# Patient Record
Sex: FEMALE | Race: OTHER | HISPANIC OR LATINO | URBAN - METROPOLITAN AREA
[De-identification: names, ages, dates, MRNs, and addresses within clinical notes are randomized per-mention and may not be internally consistent; named-entity substitution may affect disease eponyms.]

---

## 2023-05-19 ENCOUNTER — INPATIENT (INPATIENT)
Facility: HOSPITAL | Age: 53
LOS: 2 days | Discharge: HOME | DRG: 872 | End: 2023-05-22
Attending: INTERNAL MEDICINE
Payer: COMMERCIAL

## 2023-05-19 VITALS
OXYGEN SATURATION: 93 % | SYSTOLIC BLOOD PRESSURE: 165 MMHG | RESPIRATION RATE: 18 BRPM | TEMPERATURE: 100 F | DIASTOLIC BLOOD PRESSURE: 87 MMHG | HEIGHT: 60 IN | HEART RATE: 103 BPM | WEIGHT: 123.02 LBS

## 2023-05-19 LAB
ALBUMIN SERPL ELPH-MCNC: 2.5 G/DL — LOW (ref 3.4–5)
ALP SERPL-CCNC: 425 U/L — HIGH (ref 40–120)
ALT FLD-CCNC: 162 U/L — HIGH (ref 12–42)
ANION GAP SERPL CALC-SCNC: 11 MMOL/L — SIGNIFICANT CHANGE UP (ref 9–16)
ANION GAP SERPL CALC-SCNC: 9 MMOL/L — SIGNIFICANT CHANGE UP (ref 9–16)
APPEARANCE UR: CLEAR — SIGNIFICANT CHANGE UP
APTT BLD: 30.5 SEC — SIGNIFICANT CHANGE UP (ref 27.5–35.5)
AST SERPL-CCNC: 114 U/L — HIGH (ref 15–37)
BACTERIA # UR AUTO: SIGNIFICANT CHANGE UP /HPF
BASOPHILS # BLD AUTO: 0 K/UL — SIGNIFICANT CHANGE UP (ref 0–0.2)
BASOPHILS NFR BLD AUTO: 0 % — SIGNIFICANT CHANGE UP (ref 0–2)
BILIRUB SERPL-MCNC: 3.4 MG/DL — HIGH (ref 0.2–1.2)
BILIRUB UR-MCNC: NEGATIVE — SIGNIFICANT CHANGE UP
BUN SERPL-MCNC: 5 MG/DL — LOW (ref 7–23)
BUN SERPL-MCNC: 5 MG/DL — LOW (ref 7–23)
CALCIUM SERPL-MCNC: 8.3 MG/DL — LOW (ref 8.5–10.5)
CALCIUM SERPL-MCNC: 8.7 MG/DL — SIGNIFICANT CHANGE UP (ref 8.5–10.5)
CHLORIDE SERPL-SCNC: 102 MMOL/L — SIGNIFICANT CHANGE UP (ref 96–108)
CHLORIDE SERPL-SCNC: 96 MMOL/L — SIGNIFICANT CHANGE UP (ref 96–108)
CO2 SERPL-SCNC: 28 MMOL/L — SIGNIFICANT CHANGE UP (ref 22–31)
CO2 SERPL-SCNC: 30 MMOL/L — SIGNIFICANT CHANGE UP (ref 22–31)
COLOR SPEC: YELLOW — SIGNIFICANT CHANGE UP
COMMENT - URINE: SIGNIFICANT CHANGE UP
CREAT SERPL-MCNC: 0.62 MG/DL — SIGNIFICANT CHANGE UP (ref 0.5–1.3)
CREAT SERPL-MCNC: 0.62 MG/DL — SIGNIFICANT CHANGE UP (ref 0.5–1.3)
CRP SERPL-MCNC: >120 MG/L — HIGH (ref 0–9)
DIFF PNL FLD: ABNORMAL
EGFR: 107 ML/MIN/1.73M2 — SIGNIFICANT CHANGE UP
EGFR: 107 ML/MIN/1.73M2 — SIGNIFICANT CHANGE UP
EOSINOPHIL # BLD AUTO: 0 K/UL — SIGNIFICANT CHANGE UP (ref 0–0.5)
EOSINOPHIL NFR BLD AUTO: 0 % — SIGNIFICANT CHANGE UP (ref 0–6)
EPI CELLS # UR: ABNORMAL /HPF (ref 0–5)
GLUCOSE SERPL-MCNC: 137 MG/DL — HIGH (ref 70–99)
GLUCOSE SERPL-MCNC: 143 MG/DL — HIGH (ref 70–99)
GLUCOSE UR QL: NEGATIVE — SIGNIFICANT CHANGE UP
HCG SERPL-ACNC: 2 MIU/ML — SIGNIFICANT CHANGE UP
HCT VFR BLD CALC: 29.8 % — LOW (ref 34.5–45)
HGB BLD-MCNC: 10.2 G/DL — LOW (ref 11.5–15.5)
HIV 1 & 2 AB SERPL IA.RAPID: SIGNIFICANT CHANGE UP
INR BLD: 1.29 — HIGH (ref 0.88–1.16)
KETONES UR-MCNC: NEGATIVE — SIGNIFICANT CHANGE UP
LACTATE SERPL-SCNC: 0.8 MMOL/L — SIGNIFICANT CHANGE UP (ref 0.4–2)
LEUKOCYTE ESTERASE UR-ACNC: NEGATIVE — SIGNIFICANT CHANGE UP
LYMPHOCYTES # BLD AUTO: 19 % — SIGNIFICANT CHANGE UP (ref 13–44)
LYMPHOCYTES # BLD AUTO: 4.84 K/UL — HIGH (ref 1–3.3)
MAGNESIUM SERPL-MCNC: 1.6 MG/DL — SIGNIFICANT CHANGE UP (ref 1.6–2.6)
MANUAL SMEAR VERIFICATION: SIGNIFICANT CHANGE UP
MCHC RBC-ENTMCNC: 29 PG — SIGNIFICANT CHANGE UP (ref 27–34)
MCHC RBC-ENTMCNC: 34.2 GM/DL — SIGNIFICANT CHANGE UP (ref 32–36)
MCV RBC AUTO: 84.7 FL — SIGNIFICANT CHANGE UP (ref 80–100)
METAMYELOCYTES # FLD: 1 % — HIGH (ref 0–0)
MONOCYTES # BLD AUTO: 1.27 K/UL — HIGH (ref 0–0.9)
MONOCYTES NFR BLD AUTO: 5 % — SIGNIFICANT CHANGE UP (ref 2–14)
NEUTROPHILS # BLD AUTO: 19.1 K/UL — HIGH (ref 1.8–7.4)
NEUTROPHILS NFR BLD AUTO: 74 % — SIGNIFICANT CHANGE UP (ref 43–77)
NEUTS BAND # BLD: 1 % — SIGNIFICANT CHANGE UP (ref 0–8)
NEUTS VAC BLD QL SMEAR: SLIGHT — SIGNIFICANT CHANGE UP
NITRITE UR-MCNC: NEGATIVE — SIGNIFICANT CHANGE UP
NRBC # BLD: 0 /100 — SIGNIFICANT CHANGE UP (ref 0–0)
PH UR: 7 — SIGNIFICANT CHANGE UP (ref 5–8)
PLAT MORPH BLD: NORMAL — SIGNIFICANT CHANGE UP
PLATELET # BLD AUTO: 375 K/UL — SIGNIFICANT CHANGE UP (ref 150–400)
POLYCHROMASIA BLD QL SMEAR: SLIGHT — SIGNIFICANT CHANGE UP
POTASSIUM SERPL-MCNC: 2.6 MMOL/L — CRITICAL LOW (ref 3.5–5.3)
POTASSIUM SERPL-MCNC: 3.4 MMOL/L — LOW (ref 3.5–5.3)
POTASSIUM SERPL-SCNC: 2.6 MMOL/L — CRITICAL LOW (ref 3.5–5.3)
POTASSIUM SERPL-SCNC: 3.4 MMOL/L — LOW (ref 3.5–5.3)
PROT SERPL-MCNC: 8 G/DL — SIGNIFICANT CHANGE UP (ref 6.4–8.2)
PROT UR-MCNC: NEGATIVE MG/DL — SIGNIFICANT CHANGE UP
PROTHROM AB SERPL-ACNC: 15 SEC — HIGH (ref 10.5–13.4)
RBC # BLD: 3.52 M/UL — LOW (ref 3.8–5.2)
RBC # FLD: 13.3 % — SIGNIFICANT CHANGE UP (ref 10.3–14.5)
RBC BLD AUTO: NORMAL — SIGNIFICANT CHANGE UP
RBC CASTS # UR COMP ASSIST: < 5 /HPF — SIGNIFICANT CHANGE UP
SARS-COV-2 RNA SPEC QL NAA+PROBE: SIGNIFICANT CHANGE UP
SODIUM SERPL-SCNC: 137 MMOL/L — SIGNIFICANT CHANGE UP (ref 132–145)
SODIUM SERPL-SCNC: 139 MMOL/L — SIGNIFICANT CHANGE UP (ref 132–145)
SP GR SPEC: <=1.005 — SIGNIFICANT CHANGE UP (ref 1–1.03)
UROBILINOGEN FLD QL: 0.2 E.U./DL — SIGNIFICANT CHANGE UP
WBC # BLD: 25.46 K/UL — HIGH (ref 3.8–10.5)
WBC # FLD AUTO: 25.46 K/UL — HIGH (ref 3.8–10.5)
WBC UR QL: < 5 /HPF — SIGNIFICANT CHANGE UP

## 2023-05-19 PROCEDURE — 76705 ECHO EXAM OF ABDOMEN: CPT | Mod: 26

## 2023-05-19 PROCEDURE — 99285 EMERGENCY DEPT VISIT HI MDM: CPT

## 2023-05-19 PROCEDURE — 73201 CT UPPER EXTREMITY W/DYE: CPT | Mod: 26,LT

## 2023-05-19 PROCEDURE — 73130 X-RAY EXAM OF HAND: CPT | Mod: 26,LT

## 2023-05-19 RX ORDER — ACETAMINOPHEN 500 MG
650 TABLET ORAL ONCE
Refills: 0 | Status: COMPLETED | OUTPATIENT
Start: 2023-05-19 | End: 2023-05-19

## 2023-05-19 RX ORDER — CEFTRIAXONE 500 MG/1
1000 INJECTION, POWDER, FOR SOLUTION INTRAMUSCULAR; INTRAVENOUS ONCE
Refills: 0 | Status: COMPLETED | OUTPATIENT
Start: 2023-05-19 | End: 2023-05-19

## 2023-05-19 RX ORDER — SODIUM CHLORIDE 9 MG/ML
1400 INJECTION, SOLUTION INTRAVENOUS ONCE
Refills: 0 | Status: COMPLETED | OUTPATIENT
Start: 2023-05-19 | End: 2023-05-19

## 2023-05-19 RX ORDER — POTASSIUM CHLORIDE 20 MEQ
40 PACKET (EA) ORAL ONCE
Refills: 0 | Status: COMPLETED | OUTPATIENT
Start: 2023-05-19 | End: 2023-05-19

## 2023-05-19 RX ORDER — KETOROLAC TROMETHAMINE 30 MG/ML
15 SYRINGE (ML) INJECTION ONCE
Refills: 0 | Status: DISCONTINUED | OUTPATIENT
Start: 2023-05-19 | End: 2023-05-19

## 2023-05-19 RX ORDER — VANCOMYCIN HCL 1 G
1000 VIAL (EA) INTRAVENOUS ONCE
Refills: 0 | Status: COMPLETED | OUTPATIENT
Start: 2023-05-19 | End: 2023-05-19

## 2023-05-19 RX ORDER — POTASSIUM CHLORIDE 20 MEQ
10 PACKET (EA) ORAL ONCE
Refills: 0 | Status: COMPLETED | OUTPATIENT
Start: 2023-05-19 | End: 2023-05-19

## 2023-05-19 RX ADMIN — Medication 100 MILLIEQUIVALENT(S): at 15:28

## 2023-05-19 RX ADMIN — SODIUM CHLORIDE 1400 MILLILITER(S): 9 INJECTION, SOLUTION INTRAVENOUS at 13:48

## 2023-05-19 RX ADMIN — CEFTRIAXONE 100 MILLIGRAM(S): 500 INJECTION, POWDER, FOR SOLUTION INTRAMUSCULAR; INTRAVENOUS at 13:47

## 2023-05-19 RX ADMIN — Medication 15 MILLIGRAM(S): at 14:34

## 2023-05-19 RX ADMIN — Medication 40 MILLIEQUIVALENT(S): at 15:01

## 2023-05-19 RX ADMIN — Medication 250 MILLIGRAM(S): at 14:34

## 2023-05-19 RX ADMIN — Medication 650 MILLIGRAM(S): at 14:34

## 2023-05-19 NOTE — ED PROVIDER NOTE - CLINICAL SUMMARY MEDICAL DECISION MAKING FREE TEXT BOX
Chest patient arrives as a sepsis code activation due to fever/tachycardia.  On examination patient with left hand cellulitis with erythema in the dorsum and seems to be stemming from the base of the fourth digit.  No planes of entry, no tissue defects, no fluctuation clinically does not seem like an abscess.  There seems to be tracking proximally so concern for rapidly progressing infection.  Will get sepsis labs, cultures, broad-spectrum antibiotics we will get an x-ray to rule out fracture but also a CT to assess for any signs of soft tissue abscess/fluid collection.

## 2023-05-19 NOTE — ED ADULT NURSE NOTE - ISOLATION TYPE:
None Detail Level: Detailed Depth Of Biopsy: dermis Was A Bandage Applied: Yes Size Of Lesion In Cm: 0 Biopsy Type: H and E Biopsy Method: Dermablade Anesthesia Type: 1% lidocaine with epinephrine and a 1:10 solution of 8.4% sodium bicarbonate Anesthesia Volume In Cc (Will Not Render If 0): 0.5 Hemostasis: Drysol Wound Care: Vaseline Dressing: pressure dressing with telfa Destruction After The Procedure: No Type Of Destruction Used: Curettage Lab: [id number] Lab Facility: [id number] Consent: Verbal consent was obtained and risks were reviewed including but not limited to scarring, infection, bleeding, scabbing, incomplete removal, nerve damage and allergy to anesthesia. Post-Care Instructions: I reviewed with the patient in detail post-care instructions. Patient is to keep the biopsy site dry overnight, and then apply bacitracin twice daily until healed. Patient may apply hydrogen peroxide soaks to remove any crusting. Notification Instructions: Patient will be notified of biopsy results. However, patient instructed to call the office if not contacted within 2 weeks. Billing Type: Third-Party Bill Information: Selecting Yes will display possible errors in your note based on the variables you have selected. This validation is only offered as a suggestion for you. PLEASE NOTE THAT THE VALIDATION TEXT WILL BE REMOVED WHEN YOU FINALIZE YOUR NOTE. IF YOU WANT TO FAX A PRELIMINARY NOTE YOU WILL NEED TO TOGGLE THIS TO 'NO' IF YOU DO NOT WANT IT IN YOUR FAXED NOTE. No

## 2023-05-19 NOTE — ED ADULT NURSE NOTE - NSFALLUNIVINTERV_ED_ALL_ED
Bed/Stretcher in lowest position, wheels locked, appropriate side rails in place/Call bell, personal items and telephone in reach/Instruct patient to call for assistance before getting out of bed/chair/stretcher/Non-slip footwear applied when patient is off stretcher/[place] to call system/Physically safe environment - no spills, clutter or unnecessary equipment/Purposeful proactive rounding/Room/bathroom lighting operational, light cord in reach

## 2023-05-19 NOTE — ED PROVIDER NOTE - OBJECTIVE STATEMENT
52-year-old female, no history of medical problems.  Patient currently traveling from [place] but originally from the [facility].  Patient presents for 3 days progression of left hand pain, swelling, redness.  Patient then developed fever chills while traveling from [place] to [place] where she is currently visiting.  Patient was seen at urgent care 2 days ago and prescribed Keflex every 6 hours which she has been taking as indicated.  Today is day 3 of antibiotics.  Since left hand redness and swelling has worsened and it seems to be moving proximally to the forearm.  Patient does not remember any trauma to the area, any leg tissue defects and the insect bites etc.  Denies any history of leg abscesses or cellulitis in the past.

## 2023-05-19 NOTE — ED ADULT NURSE NOTE - OBJECTIVE STATEMENT
pt c/o cellulitis of L hand cellulitis/redness/swelling since tuesday. pt states she was seen at  and sent abx. denies pain on palpation, +ROM of fingers. pt warm to touch, septic workup initiated.

## 2023-05-19 NOTE — ED ADULT TRIAGE NOTE - CHIEF COMPLAINT QUOTE
c/o L hand pain/redness swelling starting with one finger and R FA redness/swelling on 5/16, went to UC yesterday, rx'd keflex, R arm has gotten better but L hand is worse. SIRS criteria met, protocol initiated and followed

## 2023-05-19 NOTE — ED PROVIDER NOTE - MUSCULOSKELETAL, MLM
L 4th digit swelling, redness, also extending proximally into dorsum of hand and streaking proximally to forearm

## 2023-05-19 NOTE — ED PROVIDER NOTE - PROGRESS NOTE DETAILS
Got a call from the lab for significant hypokalemia with a potassium of 2.6.  Called lab added magnesium level to the work-up.  Will replace potassium orally and IV apple placing cardiac monitoring during replacement. Hypokalemia resolved after IV and p.o. replacement.  Scans consistent with tenosynovitis but no abscess.  Concern for cellulitis given fever, chills, leukocytosis.  Initially called telemetry attending Dr. Clark to consider telemetry admission given hypokalemia on the 3 but she states these cases are usually surgical admissions.  Repeated labs and K improved.  Presented case them to hand surgery on-call Dr. Barton and he recommends internal medicine admission for IV antibiotics and he can come can be consulted in the hospital as needed.  Then attempted admitting to hospitalist service and now awaiting callback for admission. nerissa: patient endorsed to me, pending admission. called ctc, dr jin Hospitalist on-call excepted patient to [facility].  Patient's labs were reviewed and are stable.  Patient consented for transfer to [facility] she is aware that she will be transferred.

## 2023-05-20 DIAGNOSIS — R74.01 ELEVATION OF LEVELS OF LIVER TRANSAMINASE LEVELS: ICD-10-CM

## 2023-05-20 DIAGNOSIS — Z29.9 ENCOUNTER FOR PROPHYLACTIC MEASURES, UNSPECIFIED: ICD-10-CM

## 2023-05-20 DIAGNOSIS — A41.9 SEPSIS, UNSPECIFIED ORGANISM: ICD-10-CM

## 2023-05-20 LAB
ALBUMIN SERPL ELPH-MCNC: 3 G/DL — LOW (ref 3.3–5)
ALP SERPL-CCNC: 396 U/L — HIGH (ref 40–120)
ALT FLD-CCNC: 151 U/L — HIGH (ref 10–45)
ANION GAP SERPL CALC-SCNC: 12 MMOL/L — SIGNIFICANT CHANGE UP (ref 5–17)
AST SERPL-CCNC: 119 U/L — HIGH (ref 10–40)
BASOPHILS # BLD AUTO: 0.07 K/UL — SIGNIFICANT CHANGE UP (ref 0–0.2)
BASOPHILS NFR BLD AUTO: 0.4 % — SIGNIFICANT CHANGE UP (ref 0–2)
BILIRUB SERPL-MCNC: 2 MG/DL — HIGH (ref 0.2–1.2)
BUN SERPL-MCNC: 4 MG/DL — LOW (ref 7–23)
CALCIUM SERPL-MCNC: 8.9 MG/DL — SIGNIFICANT CHANGE UP (ref 8.4–10.5)
CHLORIDE SERPL-SCNC: 100 MMOL/L — SIGNIFICANT CHANGE UP (ref 96–108)
CO2 SERPL-SCNC: 26 MMOL/L — SIGNIFICANT CHANGE UP (ref 22–31)
CREAT SERPL-MCNC: 0.45 MG/DL — LOW (ref 0.5–1.3)
CRP SERPL-MCNC: 95.1 MG/L — HIGH (ref 0–4)
CULTURE RESULTS: NO GROWTH — SIGNIFICANT CHANGE UP
EGFR: 116 ML/MIN/1.73M2 — SIGNIFICANT CHANGE UP
EOSINOPHIL # BLD AUTO: 0.08 K/UL — SIGNIFICANT CHANGE UP (ref 0–0.5)
EOSINOPHIL NFR BLD AUTO: 0.4 % — SIGNIFICANT CHANGE UP (ref 0–6)
ERYTHROCYTE [SEDIMENTATION RATE] IN BLOOD: 107 MM/HR — HIGH
GGT SERPL-CCNC: 613 U/L — HIGH (ref 8–40)
GLUCOSE SERPL-MCNC: 133 MG/DL — HIGH (ref 70–99)
HAV IGM SER-ACNC: SIGNIFICANT CHANGE UP
HBV CORE AB SER-ACNC: SIGNIFICANT CHANGE UP
HBV CORE IGM SER-ACNC: SIGNIFICANT CHANGE UP
HBV SURFACE AB SER-ACNC: SIGNIFICANT CHANGE UP
HBV SURFACE AG SER-ACNC: SIGNIFICANT CHANGE UP
HCT VFR BLD CALC: 30.7 % — LOW (ref 34.5–45)
HCV AB S/CO SERPL IA: 0.1 S/CO — SIGNIFICANT CHANGE UP (ref 0–0.99)
HCV AB SERPL-IMP: SIGNIFICANT CHANGE UP
HGB BLD-MCNC: 10.3 G/DL — LOW (ref 11.5–15.5)
IMM GRANULOCYTES NFR BLD AUTO: 4 % — HIGH (ref 0–0.9)
LYMPHOCYTES # BLD AUTO: 16.6 % — SIGNIFICANT CHANGE UP (ref 13–44)
LYMPHOCYTES # BLD AUTO: 3.12 K/UL — SIGNIFICANT CHANGE UP (ref 1–3.3)
MAGNESIUM SERPL-MCNC: 1.8 MG/DL — SIGNIFICANT CHANGE UP (ref 1.6–2.6)
MCHC RBC-ENTMCNC: 28.9 PG — SIGNIFICANT CHANGE UP (ref 27–34)
MCHC RBC-ENTMCNC: 33.6 GM/DL — SIGNIFICANT CHANGE UP (ref 32–36)
MCV RBC AUTO: 86.2 FL — SIGNIFICANT CHANGE UP (ref 80–100)
MONOCYTES # BLD AUTO: 0.98 K/UL — HIGH (ref 0–0.9)
MONOCYTES NFR BLD AUTO: 5.2 % — SIGNIFICANT CHANGE UP (ref 2–14)
NEUTROPHILS # BLD AUTO: 13.85 K/UL — HIGH (ref 1.8–7.4)
NEUTROPHILS NFR BLD AUTO: 73.4 % — SIGNIFICANT CHANGE UP (ref 43–77)
NRBC # BLD: 0 /100 WBCS — SIGNIFICANT CHANGE UP (ref 0–0)
PHOSPHATE SERPL-MCNC: 3.1 MG/DL — SIGNIFICANT CHANGE UP (ref 2.5–4.5)
PLATELET # BLD AUTO: 386 K/UL — SIGNIFICANT CHANGE UP (ref 150–400)
POTASSIUM SERPL-MCNC: 3.4 MMOL/L — LOW (ref 3.5–5.3)
POTASSIUM SERPL-SCNC: 3.4 MMOL/L — LOW (ref 3.5–5.3)
PROT SERPL-MCNC: 7.5 G/DL — SIGNIFICANT CHANGE UP (ref 6–8.3)
RBC # BLD: 3.56 M/UL — LOW (ref 3.8–5.2)
RBC # FLD: 13.6 % — SIGNIFICANT CHANGE UP (ref 10.3–14.5)
SODIUM SERPL-SCNC: 138 MMOL/L — SIGNIFICANT CHANGE UP (ref 135–145)
SPECIMEN SOURCE: SIGNIFICANT CHANGE UP
VANCOMYCIN TROUGH SERPL-MCNC: 8.1 UG/ML — LOW (ref 10–20)
WBC # BLD: 18.85 K/UL — HIGH (ref 3.8–10.5)
WBC # FLD AUTO: 18.85 K/UL — HIGH (ref 3.8–10.5)

## 2023-05-20 PROCEDURE — 73219 MRI UPPER EXTREMITY W/DYE: CPT | Mod: 26,LT

## 2023-05-20 PROCEDURE — 99223 1ST HOSP IP/OBS HIGH 75: CPT | Mod: GC

## 2023-05-20 RX ORDER — LANOLIN ALCOHOL/MO/W.PET/CERES
3 CREAM (GRAM) TOPICAL AT BEDTIME
Refills: 0 | Status: DISCONTINUED | OUTPATIENT
Start: 2023-05-20 | End: 2023-05-22

## 2023-05-20 RX ORDER — ENOXAPARIN SODIUM 100 MG/ML
40 INJECTION SUBCUTANEOUS EVERY 24 HOURS
Refills: 0 | Status: DISCONTINUED | OUTPATIENT
Start: 2023-05-20 | End: 2023-05-22

## 2023-05-20 RX ORDER — POTASSIUM CHLORIDE 20 MEQ
20 PACKET (EA) ORAL ONCE
Refills: 0 | Status: COMPLETED | OUTPATIENT
Start: 2023-05-20 | End: 2023-05-20

## 2023-05-20 RX ORDER — ONDANSETRON 8 MG/1
4 TABLET, FILM COATED ORAL EVERY 8 HOURS
Refills: 0 | Status: DISCONTINUED | OUTPATIENT
Start: 2023-05-20 | End: 2023-05-22

## 2023-05-20 RX ORDER — ACETAMINOPHEN 500 MG
650 TABLET ORAL EVERY 6 HOURS
Refills: 0 | Status: DISCONTINUED | OUTPATIENT
Start: 2023-05-20 | End: 2023-05-22

## 2023-05-20 RX ORDER — MORPHINE SULFATE 50 MG/1
2 CAPSULE, EXTENDED RELEASE ORAL ONCE
Refills: 0 | Status: DISCONTINUED | OUTPATIENT
Start: 2023-05-20 | End: 2023-05-20

## 2023-05-20 RX ORDER — VANCOMYCIN HCL 1 G
1000 VIAL (EA) INTRAVENOUS ONCE
Refills: 0 | Status: COMPLETED | OUTPATIENT
Start: 2023-05-20 | End: 2023-05-20

## 2023-05-20 RX ORDER — POTASSIUM CHLORIDE 20 MEQ
40 PACKET (EA) ORAL ONCE
Refills: 0 | Status: COMPLETED | OUTPATIENT
Start: 2023-05-20 | End: 2023-05-20

## 2023-05-20 RX ORDER — KETOROLAC TROMETHAMINE 30 MG/ML
15 SYRINGE (ML) INJECTION ONCE
Refills: 0 | Status: DISCONTINUED | OUTPATIENT
Start: 2023-05-20 | End: 2023-05-20

## 2023-05-20 RX ORDER — MAGNESIUM SULFATE 500 MG/ML
1 VIAL (ML) INJECTION ONCE
Refills: 0 | Status: COMPLETED | OUTPATIENT
Start: 2023-05-20 | End: 2023-05-20

## 2023-05-20 RX ORDER — CEFTRIAXONE 500 MG/1
1000 INJECTION, POWDER, FOR SOLUTION INTRAMUSCULAR; INTRAVENOUS EVERY 24 HOURS
Refills: 0 | Status: DISCONTINUED | OUTPATIENT
Start: 2023-05-20 | End: 2023-05-22

## 2023-05-20 RX ORDER — VANCOMYCIN HCL 1 G
1000 VIAL (EA) INTRAVENOUS EVERY 12 HOURS
Refills: 0 | Status: COMPLETED | OUTPATIENT
Start: 2023-05-20 | End: 2023-05-20

## 2023-05-20 RX ORDER — VANCOMYCIN HCL 1 G
1500 VIAL (EA) INTRAVENOUS EVERY 12 HOURS
Refills: 0 | Status: DISCONTINUED | OUTPATIENT
Start: 2023-05-21 | End: 2023-05-21

## 2023-05-20 RX ADMIN — Medication 15 MILLIGRAM(S): at 02:35

## 2023-05-20 RX ADMIN — Medication 20 MILLIEQUIVALENT(S): at 12:20

## 2023-05-20 RX ADMIN — ENOXAPARIN SODIUM 40 MILLIGRAM(S): 100 INJECTION SUBCUTANEOUS at 07:35

## 2023-05-20 RX ADMIN — Medication 100 GRAM(S): at 09:44

## 2023-05-20 RX ADMIN — CEFTRIAXONE 100 MILLIGRAM(S): 500 INJECTION, POWDER, FOR SOLUTION INTRAMUSCULAR; INTRAVENOUS at 07:34

## 2023-05-20 RX ADMIN — Medication 250 MILLIGRAM(S): at 12:21

## 2023-05-20 RX ADMIN — MORPHINE SULFATE 2 MILLIGRAM(S): 50 CAPSULE, EXTENDED RELEASE ORAL at 02:35

## 2023-05-20 RX ADMIN — Medication 250 MILLIGRAM(S): at 01:52

## 2023-05-20 RX ADMIN — Medication 40 MILLIEQUIVALENT(S): at 09:42

## 2023-05-20 NOTE — PROGRESS NOTE ADULT - ASSESSMENT
Problem: Patient Care Overview  Goal: Plan of Care Review  Outcome: Ongoing (interventions implemented as appropriate)  Pt is resting in bed, continues to complain of sharp pain to lower abdomen. Pt's right chest port is currently saline locked. VS stable, pt is afebrile. Pt's personal items and call bell placed within easy reach. Bed is locked, in lowest position, with side rails up x 2. Non-skid socks in place. Pt encouraged to call for assistance as needed. Will continue to monitor.       Patient is a 51 yo F with no known past medical Hx, who presents with L hand swelling, erythema, and pain, admitted for L hand cellulitis.

## 2023-05-20 NOTE — H&P ADULT - ASSESSMENT
Patient is a 51 yo F with no known past medical Hx, who presents with L hand swelling, erythema, and pain, admitted for L hand cellulitis.

## 2023-05-20 NOTE — CONSULT NOTE ADULT - SUBJECTIVE AND OBJECTIVE BOX
Orthopaedic Hand Surgery Consult Note    For Surgeon: Dr Thompson     HPI:  Patient is a 51 yo F with no known medical Hx. Patient is currently traveling from [place], but is originally from the [facility]. Patient presents with three days of worsening L hand pain, swelling, and erythema. Patient first developed subjective fever and chills while traveling from [place] to [place]. Patient sought care at an urgent care facility that where she was prescribed Keflex 500 mg PO Q6 which she has been compliant with. Today is day three of ABX. Patient has noticed a marked improvment since her admission to the ED.    ED Course  Vitals (triage): 165/87 103 99.7 93% on RA  Labs: WBC 25.46 Hb 10.2 K 2.6 > 3.4 bilirubin 3.4 Alk Phos 425   CRP > 120  EKG: NSR  Imagin.  Acute left extensor carpi ulnaris tenosynovitis at the level of the wrist. Acute left ring finger common flexor tenosynovitis centered at the level of the MCP. Differential includes both inflammatory and infectious etiologies. Consider follow-up left hand and wrist MRIs. 2.  No drainable mature abscess or tracking soft tissue emphysema  Medication: morphine 2 mg IV ketorolac 15 mg IV x 2 K 50 mEq Vancomycin 1 g x 2 Ceftriaxone 1 g   (20 May 2023 03:46)    Ortho Addendum  Ortho consulted for atrumatic L hand and 4th finger swelling and erythema x 3d. Endorses an ep of chill assoc w/ sxs. States that swelling, erythema, and pain has significantly improved since she started receiving IV abx in ED and at [facility]     Vital Signs Last 24 Hrs  T(C): 36.9 (20 May 2023 13:11), Max: 36.9 (19 May 2023 21:28)  T(F): 98.5 (20 May 2023 13:11), Max: 98.5 (20 May 2023 13:11)  HR: 99 (20 May 2023 13:11) (86 - 99)  BP: 170/92 (20 May 2023 13:11) (162/81 - 172/99)  BP(mean): --  RR: 19 (20 May 2023 13:11) (16 - 19)  SpO2: 99% (20 May 2023 13:11) (96% - 99%)    Parameters below as of 20 May 2023 13:11  Patient On (Oxygen Delivery Method): room air        Physical Exam:  General: AAOx3, NAD  L Hand: + swelling and erythema @ dorsum of L hand along w/ dorsum of L MCP and L 4th prox phalanx   ROM of L 4th finger MCP/PIP limited 2/2 swelling, reduced ROM of L 4th DIP as well, FROM all other joints of hand and wrist of LUE   SILT grossly M/R/U/LABC/MABC  Radial Pulse 2+  No pain with passive stretch of L 4th finger  Flexor sheath of L 4th finger non ttp     Imaging:   CT personal read demonstrates enhancement @ ECU tendon near ulnar head and fat stranding dorsally @ level of MCPJ     A/P: 52yFemale w cellulitis of L hand and L 4th finger. No concern for pyogenic flexor tenosynovitis of 4th finger at this time     Plan   - c/w IV abx as per med  - elevate LUE at all times in liz block   - rest of care per primary     - Discussed with Dr. Jay Esparza, PGY-2  Orthopedic Surgery

## 2023-05-20 NOTE — H&P ADULT - PROBLEM SELECTOR PLAN 2
Likely 2/2 sepsis. RUQ US WNL.   - complete hepatitis panel  - added on GGT  - continue to trend LFT

## 2023-05-20 NOTE — PROGRESS NOTE ADULT - PROBLEM SELECTOR PLAN 1
# Cellulitis of Left Hand  Pt meeting 2/4 SIRS on admission:  and WBC 25.46. Likely 2/2 L hand cellulitis. L hand swelling extending from all fingers to just past wrist. No numbness/tingling. Pulses intact. Notable warmth and swelling on exam.   - c/w Ceftriaxone 1g QD  - c/w Vancomycin 1g Q12   - f/u Vancomycin trough ordered for 10:00 pm 5/20  - hand surgery consulted, appreciate recs  - f/u MRI hand

## 2023-05-20 NOTE — PROGRESS NOTE ADULT - SUBJECTIVE AND OBJECTIVE BOX
SUBJECTIVE:  Patient seen and examined at bedside.    Vital Signs Last 12 Hrs  T(F): 98.5 (05-20-23 @ 13:11), Max: 98.5 (05-20-23 @ 13:11)  HR: 99 (05-20-23 @ 13:11) (87 - 99)  BP: 170/92 (05-20-23 @ 13:11) (164/84 - 170/92)  BP(mean): --  RR: 19 (05-20-23 @ 13:11) (18 - 19)  SpO2: 99% (05-20-23 @ 13:11) (98% - 99%)  I&O's Summary      PHYSICAL EXAM:  Constitutional: NAD, comfortable in bed.  HEENT: NC/AT, PERRLA, EOMI, no conjunctival pallor or scleral icterus, MMM  Neck: Supple, no JVD  Respiratory: CTA B/L. No w/r/r.   Cardiovascular: RRR, normal S1 and S2, no m/r/g.   Gastrointestinal: +BS, soft NTND, no guarding or rebound tenderness, no palpable masses   Extremities: wwp; no cyanosis, clubbing or edema.   Vascular: Pulses equal and strong throughout.   Neurological: AAOx3, no CN deficits, strength and sensation intact throughout.   Skin: No gross skin abnormalities or rashes        LABS:                        10.3   18.85 )-----------( 386      ( 20 May 2023 05:30 )             30.7     05-20    138  |  100  |  4<L>  ----------------------------<  133<H>  3.4<L>   |  26  |  0.45<L>    Ca    8.9      20 May 2023 05:30  Phos  3.1     05-20  Mg     1.8     05-20    TPro  7.5  /  Alb  3.0<L>  /  TBili  2.0<H>  /  DBili  x   /  AST  119<H>  /  ALT  151<H>  /  AlkPhos  396<H>  05-20    PT/INR - ( 19 May 2023 13:23 )   PT: 15.0 sec;   INR: 1.29          PTT - ( 19 May 2023 13:23 )  PTT:30.5 sec  Urinalysis Basic - ( 19 May 2023 14:30 )    Color: Yellow / Appearance: Clear / SG: <=1.005 / pH: x  Gluc: x / Ketone: NEGATIVE  / Bili: NEGATIVE / Urobili: 0.2 E.U./dL   Blood: x / Protein: NEGATIVE mg/dL / Nitrite: NEGATIVE   Leuk Esterase: NEGATIVE / RBC: < 5 /HPF / WBC < 5 /HPF   Sq Epi: x / Non Sq Epi: x / Bacteria: None /HPF          RADIOLOGY & ADDITIONAL TESTS:    MEDICATIONS  (STANDING):  cefTRIAXone   IVPB 1000 milliGRAM(s) IV Intermittent every 24 hours  enoxaparin Injectable 40 milliGRAM(s) SubCutaneous every 24 hours    MEDICATIONS  (PRN):  acetaminophen     Tablet .. 650 milliGRAM(s) Oral every 6 hours PRN Temp greater or equal to 38C (100.4F), Mild Pain (1 - 3)  aluminum hydroxide/magnesium hydroxide/simethicone Suspension 30 milliLiter(s) Oral every 4 hours PRN Dyspepsia  melatonin 3 milliGRAM(s) Oral at bedtime PRN Insomnia  ondansetron Injectable 4 milliGRAM(s) IV Push every 8 hours PRN Nausea and/or Vomiting   SUBJECTIVE:  Patient seen and examined at bedside. Denies pain, numbness, tingling of L hand. No other acute complaints.     Vital Signs Last 12 Hrs  T(F): 98.5 (05-20-23 @ 13:11), Max: 98.5 (05-20-23 @ 13:11)  HR: 99 (05-20-23 @ 13:11) (87 - 99)  BP: 170/92 (05-20-23 @ 13:11) (164/84 - 170/92)  BP(mean): --  RR: 19 (05-20-23 @ 13:11) (18 - 19)  SpO2: 99% (05-20-23 @ 13:11) (98% - 99%)  I&O's Summary      PHYSICAL EXAM:  Constitutional: NAD, comfortable in bed.  HEENT: NC/AT, EOMI, no conjunctival pallor or scleral icterus, MMM  Neck: Supple, no JVD  Respiratory: CTA B/L. No w/r/r.   Cardiovascular: RRR, normal S1 and S2, no m/r/g.   Gastrointestinal: +BS, soft NTND, no guarding or rebound tenderness, no palpable masses   Extremities: Left hand erythema and edema involving all fingers, no clear fluctuation,  no crepitus, able to move fingers, + pulses. LE warm, no edema, no calf tenderness, no focal deficit  Vascular: Pulses equal and strong throughout.   Neurological: AAOx3, no CN deficits, strength and sensation intact throughout.   Skin: No gross skin abnormalities or rashes        LABS:                        10.3   18.85 )-----------( 386      ( 20 May 2023 05:30 )             30.7     05-20    138  |  100  |  4<L>  ----------------------------<  133<H>  3.4<L>   |  26  |  0.45<L>    Ca    8.9      20 May 2023 05:30  Phos  3.1     05-20  Mg     1.8     05-20    TPro  7.5  /  Alb  3.0<L>  /  TBili  2.0<H>  /  DBili  x   /  AST  119<H>  /  ALT  151<H>  /  AlkPhos  396<H>  05-20    PT/INR - ( 19 May 2023 13:23 )   PT: 15.0 sec;   INR: 1.29          PTT - ( 19 May 2023 13:23 )  PTT:30.5 sec  Urinalysis Basic - ( 19 May 2023 14:30 )    Color: Yellow / Appearance: Clear / SG: <=1.005 / pH: x  Gluc: x / Ketone: NEGATIVE  / Bili: NEGATIVE / Urobili: 0.2 E.U./dL   Blood: x / Protein: NEGATIVE mg/dL / Nitrite: NEGATIVE   Leuk Esterase: NEGATIVE / RBC: < 5 /HPF / WBC < 5 /HPF   Sq Epi: x / Non Sq Epi: x / Bacteria: None /HPF          RADIOLOGY & ADDITIONAL TESTS:    MEDICATIONS  (STANDING):  cefTRIAXone   IVPB 1000 milliGRAM(s) IV Intermittent every 24 hours  enoxaparin Injectable 40 milliGRAM(s) SubCutaneous every 24 hours    MEDICATIONS  (PRN):  acetaminophen     Tablet .. 650 milliGRAM(s) Oral every 6 hours PRN Temp greater or equal to 38C (100.4F), Mild Pain (1 - 3)  aluminum hydroxide/magnesium hydroxide/simethicone Suspension 30 milliLiter(s) Oral every 4 hours PRN Dyspepsia  melatonin 3 milliGRAM(s) Oral at bedtime PRN Insomnia  ondansetron Injectable 4 milliGRAM(s) IV Push every 8 hours PRN Nausea and/or Vomiting

## 2023-05-20 NOTE — H&P ADULT - HISTORY OF PRESENT ILLNESS
Patient is a 53 yo F with no known medical Hx. Patient is currently traveling from [place], but is originally from the [facility]. Patient presents with three days of worsening L hand pain, swelling, and erythema. Patient first developed subjective fever and chills while traveling from [place] to [place]. Patient sought care at an urgent care facility that where she was prescribed Keflex 500 mg PO Q6 which she has been compliant with. Today is day three of ABX. Patient has noticed a marked improvment since her admission to the ED.    ED Course  Vitals (triage): 165/87 103 99.7 93% on RA  Labs: WBC 25.46 Hb 10.2 K 2.6 > 3.4 bilirubin 3.4 Alk Phos 425   CRP > 120  EKG: NSR  Imagin.  Acute left extensor carpi ulnaris tenosynovitis at the level of the wrist. Acute left ring finger common flexor tenosynovitis centered at the level of the MCP. Differential includes both inflammatory and infectious etiologies. Consider follow-up left hand and wrist MRIs. 2.  No drainable mature abscess or tracking soft tissue emphysema  Medication: morphine 2 mg IV ketorolac 15 mg IV x 2 K 50 mEq Vancomycin 1 g x 2 Ceftriaxone 1 g

## 2023-05-20 NOTE — H&P ADULT - ATTENDING COMMENTS
Patient with no PMH presents with left arm swelling. Patient denies previous trauma, no insect or animal bites. She received PO ATB as outpatient prior to presentation. This morning, patient reports left hand swelling improving. Able to better mobilize fingers, denies loss of sensation. No other complaints or events reported.    General: Initially sleeping, awakes to voice, NAD, lying in bed, speaking in full sentences, no labored breathing on RA  HEENT: AT/NC, no facial asymmetry  Lungs: no crackles, no wheezes  Heart: RRR  Abdomen: soft, non-tender  Extremities:  Left hand erythema and edema involving all fingers, no clear fluctuation,  no crepitus, able to move fingers, + pulses. LE warm, no edema, no calf tenderness, no focal deficit    Labs  WBC 25.4>18.8  H/H 10.3/30.7 MCV 86  K 3.4  Creat 0.45     Ariel T 2  CT reviewed  CXR reviewed    Sepsis  Hand cellulitis  LFTs abnormalities  Electrolytes abnormality     Patient presenting with left hand cellulitis, reports subjective improvement with Pip/Tazo/Vanc, leucocytosis improving, Get ESR/CRP, Hand surgery consult. MRI Hand. Get Vanc trough   Patient with cholestatic LFTs, denies abdominal pain, RUQ US WNL. Trend LFTs. follow-up GGT  Replete K  DVT ppx Patient with no PMH presents with left arm swelling. Patient denies previous trauma, no insect or animal bites. She received PO ATB as outpatient prior to presentation. This morning, patient reports left hand swelling improving. Able to better mobilize fingers, denies loss of sensation. No other complaints or events reported.    General: Initially sleeping, awakes to voice, NAD, lying in bed, speaking in full sentences, no labored breathing on RA  HEENT: AT/NC, no facial asymmetry  Lungs: no crackles, no wheezes  Heart: RRR  Abdomen: soft, non-tender, negative [place]   Extremities:  Left hand erythema and edema involving all fingers, no clear fluctuation,  no crepitus, able to move fingers, + pulses. LE warm, no edema, no calf tenderness, no focal deficit    Labs  WBC 25.4>18.8  H/H 10.3/30.7 MCV 86  K 3.4  Creat 0.45     Ariel T 2  CT reviewed  CXR reviewed    Sepsis  Hand cellulitis  LFTs abnormalities  Electrolytes abnormality     Patient presenting with left hand cellulitis, reports subjective improvement with Pip/Tazo/Vanc, leucocytosis improving, Get ESR/CRP, Hand surgery consult. MRI Hand. Get Vanc trough , follow-up Bcx   Patient with cholestatic LFTs, elevated ariel, denies abdominal pain, RUQ US WNL. Trend LFTs. follow-up GGT  Replete K  DVT ppx

## 2023-05-20 NOTE — H&P ADULT - PROBLEM SELECTOR PLAN 1
Likely 2/2 L hand cellulitis.  - c/w Ceftriaxone 1g QD  - c/w Vancomycin 1g Q12   - f/u Vancomycin trough ordered for 10:00 pm 5/20

## 2023-05-20 NOTE — H&P ADULT - NSHPPHYSICALEXAM_GEN_ALL_CORE
.  VITAL SIGNS:  T(C): 36.7 (05-20-23 @ 03:10), Max: 37.6 (05-19-23 @ 13:05)  T(F): 98.1 (05-20-23 @ 03:10), Max: 99.7 (05-19-23 @ 13:05)  HR: 87 (05-20-23 @ 03:10) (86 - 103)  BP: 165/81 (05-20-23 @ 03:10) (162/81 - 172/99)  BP(mean): --  RR: 18 (05-20-23 @ 03:10) (16 - 18)  SpO2: 98% (05-20-23 @ 03:10) (93% - 98%)  Wt(kg): --    PHYSICAL EXAM:    Constitutional: WDWN resting comfortably in bed; NAD  Head: NC/AT  Eyes: PERRL, EOMI, anicteric sclera  ENT: no nasal discharge; uvula midline, no oropharyngeal erythema or exudates; MMM  Neck: supple; no JVD or thyromegaly  Respiratory: CTA B/L; no W/R/R, no retractions  Cardiac: +S1/S2; RRR; no M/R/G; PMI non-displaced  Gastrointestinal: soft, NT/ND; no rebound or guarding; +BSx4  Genitourinary: normal external genitalia  Back: spine midline, no bony tenderness or step-offs; no CVAT B/L  Extremities: L hand swelling, erythema, and pain  Vascular: 2+ radial, femoral, DP/PT pulses B/L  Dermatologic: skin warm, dry and intact; no rashes, wounds, or scars  Lymphatic: no submandibular or cervical LAD  Neurologic: AAOx3; CNII-XII grossly intact; no focal deficits  Psychiatric: affect and characteristics of appearance, verbalizations, behaviors are appropriate

## 2023-05-20 NOTE — PATIENT PROFILE ADULT - FALL HARM RISK - RISK INTERVENTIONS

## 2023-05-21 LAB
ALBUMIN SERPL ELPH-MCNC: 3.2 G/DL — LOW (ref 3.3–5)
ALP SERPL-CCNC: 454 U/L — HIGH (ref 40–120)
ALT FLD-CCNC: 128 U/L — HIGH (ref 10–45)
ANION GAP SERPL CALC-SCNC: 11 MMOL/L — SIGNIFICANT CHANGE UP (ref 5–17)
AST SERPL-CCNC: 63 U/L — HIGH (ref 10–40)
BASOPHILS # BLD AUTO: 0.06 K/UL — SIGNIFICANT CHANGE UP (ref 0–0.2)
BASOPHILS NFR BLD AUTO: 0.3 % — SIGNIFICANT CHANGE UP (ref 0–2)
BILIRUB SERPL-MCNC: 1.6 MG/DL — HIGH (ref 0.2–1.2)
BUN SERPL-MCNC: 6 MG/DL — LOW (ref 7–23)
CALCIUM SERPL-MCNC: 9.2 MG/DL — SIGNIFICANT CHANGE UP (ref 8.4–10.5)
CHLORIDE SERPL-SCNC: 100 MMOL/L — SIGNIFICANT CHANGE UP (ref 96–108)
CO2 SERPL-SCNC: 27 MMOL/L — SIGNIFICANT CHANGE UP (ref 22–31)
CREAT SERPL-MCNC: 0.54 MG/DL — SIGNIFICANT CHANGE UP (ref 0.5–1.3)
EGFR: 111 ML/MIN/1.73M2 — SIGNIFICANT CHANGE UP
EOSINOPHIL # BLD AUTO: 0.14 K/UL — SIGNIFICANT CHANGE UP (ref 0–0.5)
EOSINOPHIL NFR BLD AUTO: 0.8 % — SIGNIFICANT CHANGE UP (ref 0–6)
GLUCOSE SERPL-MCNC: 115 MG/DL — HIGH (ref 70–99)
HCT VFR BLD CALC: 32.2 % — LOW (ref 34.5–45)
HGB BLD-MCNC: 10.5 G/DL — LOW (ref 11.5–15.5)
IMM GRANULOCYTES NFR BLD AUTO: 2.9 % — HIGH (ref 0–0.9)
LYMPHOCYTES # BLD AUTO: 17.6 % — SIGNIFICANT CHANGE UP (ref 13–44)
LYMPHOCYTES # BLD AUTO: 3.04 K/UL — SIGNIFICANT CHANGE UP (ref 1–3.3)
MCHC RBC-ENTMCNC: 29 PG — SIGNIFICANT CHANGE UP (ref 27–34)
MCHC RBC-ENTMCNC: 32.6 GM/DL — SIGNIFICANT CHANGE UP (ref 32–36)
MCV RBC AUTO: 89 FL — SIGNIFICANT CHANGE UP (ref 80–100)
MONOCYTES # BLD AUTO: 0.99 K/UL — HIGH (ref 0–0.9)
MONOCYTES NFR BLD AUTO: 5.7 % — SIGNIFICANT CHANGE UP (ref 2–14)
NEUTROPHILS # BLD AUTO: 12.57 K/UL — HIGH (ref 1.8–7.4)
NEUTROPHILS NFR BLD AUTO: 72.7 % — SIGNIFICANT CHANGE UP (ref 43–77)
NRBC # BLD: 0 /100 WBCS — SIGNIFICANT CHANGE UP (ref 0–0)
PLATELET # BLD AUTO: 462 K/UL — HIGH (ref 150–400)
POTASSIUM SERPL-MCNC: 3.6 MMOL/L — SIGNIFICANT CHANGE UP (ref 3.5–5.3)
POTASSIUM SERPL-SCNC: 3.6 MMOL/L — SIGNIFICANT CHANGE UP (ref 3.5–5.3)
PROT SERPL-MCNC: 7.9 G/DL — SIGNIFICANT CHANGE UP (ref 6–8.3)
RBC # BLD: 3.62 M/UL — LOW (ref 3.8–5.2)
RBC # FLD: 13.8 % — SIGNIFICANT CHANGE UP (ref 10.3–14.5)
SODIUM SERPL-SCNC: 138 MMOL/L — SIGNIFICANT CHANGE UP (ref 135–145)
VANCOMYCIN TROUGH SERPL-MCNC: 15.4 UG/ML — SIGNIFICANT CHANGE UP (ref 10–20)
WBC # BLD: 17.3 K/UL — HIGH (ref 3.8–10.5)
WBC # FLD AUTO: 17.3 K/UL — HIGH (ref 3.8–10.5)

## 2023-05-21 PROCEDURE — 99239 HOSP IP/OBS DSCHRG MGMT >30: CPT | Mod: GC

## 2023-05-21 RX ORDER — VANCOMYCIN HCL 1 G
1500 VIAL (EA) INTRAVENOUS ONCE
Refills: 0 | Status: COMPLETED | OUTPATIENT
Start: 2023-05-21 | End: 2023-05-21

## 2023-05-21 RX ADMIN — Medication 300 MILLIGRAM(S): at 02:14

## 2023-05-21 RX ADMIN — Medication 300 MILLIGRAM(S): at 12:54

## 2023-05-21 RX ADMIN — CEFTRIAXONE 100 MILLIGRAM(S): 500 INJECTION, POWDER, FOR SOLUTION INTRAMUSCULAR; INTRAVENOUS at 07:17

## 2023-05-21 RX ADMIN — ENOXAPARIN SODIUM 40 MILLIGRAM(S): 100 INJECTION SUBCUTANEOUS at 07:17

## 2023-05-21 NOTE — PROGRESS NOTE ADULT - SUBJECTIVE AND OBJECTIVE BOX
Patient is a 52y old  Female who presents with a chief complaint of L hand pain, erythema, and swelling (21 May 2023 09:14)    INTERVAL EVENTS:    SUBJECTIVE:  Patient was seen and examined at bedside. Patient with improved hand edema. Requesting discharge since she has a flight tomorrow. No other complaints or events reported.    Review of systems: No fever, chills, dizziness, HA, Changes in vision, CP, dyspnea, nausea or vomiting, dysuria, changes in bowel movements, LE edema. Rest of 12 point Review of systems negative unless otherwise documented elsewhere in note.     Diet, Regular (05-20-23 @ 03:36) [Active]      MEDICATIONS:  MEDICATIONS  (STANDING):  cefTRIAXone   IVPB 1000 milliGRAM(s) IV Intermittent every 24 hours  enoxaparin Injectable 40 milliGRAM(s) SubCutaneous every 24 hours  vancomycin  IVPB 1500 milliGRAM(s) IV Intermittent every 12 hours    MEDICATIONS  (PRN):  acetaminophen     Tablet .. 650 milliGRAM(s) Oral every 6 hours PRN Temp greater or equal to 38C (100.4F), Mild Pain (1 - 3)  aluminum hydroxide/magnesium hydroxide/simethicone Suspension 30 milliLiter(s) Oral every 4 hours PRN Dyspepsia  melatonin 3 milliGRAM(s) Oral at bedtime PRN Insomnia  ondansetron Injectable 4 milliGRAM(s) IV Push every 8 hours PRN Nausea and/or Vomiting      Allergies    No Known Drug Allergies  shellfish (Hives; Rash)  Seafood (Hives)    Intolerances        OBJECTIVE:  Vital Signs Last 24 Hrs  T(C): 37.5 (21 May 2023 05:08), Max: 37.5 (21 May 2023 05:08)  T(F): 99.5 (21 May 2023 05:08), Max: 99.5 (21 May 2023 05:08)  HR: 96 (21 May 2023 05:08) (96 - 99)  BP: 137/70 (21 May 2023 05:08) (137/70 - 170/92)  BP(mean): 93 (21 May 2023 05:08) (93 - 110)  RR: 18 (21 May 2023 05:08) (16 - 19)  SpO2: 97% (21 May 2023 05:08) (96% - 99%)    Parameters below as of 21 May 2023 05:08  Patient On (Oxygen Delivery Method): room air      I&O's Summary    20 May 2023 07:01  -  21 May 2023 07:00  --------------------------------------------------------  IN: 550 mL / OUT: 0 mL / NET: 550 mL        PHYSICAL EXAM:  Constitutional: NAD, comfortable in bed.  HEENT: NC/AT, EOMI, no conjunctival pallor or scleral icterus, MMM  Neck: Supple, no JVD  Respiratory: CTA B/L. No w/r/r.   Cardiovascular: RRR, normal S1 and S2, no m/r/g.   Gastrointestinal: +BS, soft NTND, no guarding or rebound tenderness, no palpable masses   Extremities: Left hand erythema and edema improved,  no clear fluctuation,  no crepitus, able to move fingers, + pulses. LE warm, no edema, no calf tenderness, no focal deficit  Vascular: Pulses equal and strong throughout.   Neurological: AAOx3, no CN deficits, strength and sensation intact throughout.   Skin: No gross skin abnormalities or rashes    LABS:                        10.5   17.30 )-----------( 462      ( 21 May 2023 05:30 )             32.2     05-21    138  |  100  |  6<L>  ----------------------------<  115<H>  3.6   |  27  |  0.54    Ca    9.2      21 May 2023 05:30  Phos  3.1     05-20  Mg     1.8     05-20    TPro  7.9  /  Alb  3.2<L>  /  TBili  1.6<H>  /  DBili  x   /  AST  63<H>  /  ALT  128<H>  /  AlkPhos  454<H>  05-21    LIVER FUNCTIONS - ( 21 May 2023 05:30 )  Alb: 3.2 g/dL / Pro: 7.9 g/dL / ALK PHOS: 454 U/L / ALT: 128 U/L / AST: 63 U/L / GGT: x           PT/INR - ( 19 May 2023 13:23 )   PT: 15.0 sec;   INR: 1.29          PTT - ( 19 May 2023 13:23 )  PTT:30.5 sec  CAPILLARY BLOOD GLUCOSE        Urinalysis Basic - ( 19 May 2023 14:30 )    Color: Yellow / Appearance: Clear / SG: <=1.005 / pH: x  Gluc: x / Ketone: NEGATIVE  / Bili: NEGATIVE / Urobili: 0.2 E.U./dL   Blood: x / Protein: NEGATIVE mg/dL / Nitrite: NEGATIVE   Leuk Esterase: NEGATIVE / RBC: < 5 /HPF / WBC < 5 /HPF   Sq Epi: x / Non Sq Epi: x / Bacteria: None /HPF        MICRODATA:    Culture - Urine (collected 19 May 2023 14:29)  Source: Clean Catch Clean Catch (Midstream)  Final Report (20 May 2023 20:30):    No growth    Culture - Blood (collected 19 May 2023 13:23)  Source: .Blood Blood-Peripheral  Preliminary Report (21 May 2023 01:02):    No growth to date.    Culture - Blood (collected 19 May 2023 13:23)  Source: .Blood Blood-Peripheral  Preliminary Report (21 May 2023 01:02):    No growth to date.        RADIOLOGY/OTHER STUDIES:

## 2023-05-21 NOTE — PROGRESS NOTE ADULT - ASSESSMENT
53 yo F with no known past medical Hx, who presents with L hand swelling, erythema, and pain, admitted for L hand cellulitis.          Problem/Plan - 1:  ·  Problem: Sepsis.   ·  Plan: # Cellulitis of Left Hand  Pt meeting 2/4 SIRS on admission:  and WBC 25.46. Likely 2/2 L hand cellulitis. L hand swelling extending from all fingers to just past wrist. No numbness/tingling. Pulses intact. Notable warmth and swelling on exam. Today improving exam  - c/w Ceftriaxone 1g QD  - c/w Vancomycin 1g Q12   - f/u Vancomycin trough ordered for 10:00 pm 5/20  - hand surgery consulted, appreciate recs  - f/u MRI hand read     Problem/Plan - 2:  ·  Problem: Transaminitis.   ·  Plan: Patient without abdominal pain, no N/.V. Exam unremarkable. RUQ US WNL  - continue to trend LFT.  -If persistent elevation will need MRCP      Problem/Plan - 3:  ·  Problem: Prophylactic measure.   ·  Plan: E: as needed  N: Regular  DVT: Lovenox 40 mg SC QD  Code: Full Code  D: [facility].   51 yo F with no known past medical Hx, who presents with L hand swelling, erythema, and pain, admitted for L hand cellulitis.          Problem/Plan - 1:  ·  Problem: Sepsis.   ·  Plan: # Cellulitis of Left Hand  Pt meeting 2/4 SIRS on admission:  and WBC 25.46. Likely 2/2 L hand cellulitis. L hand swelling extending from all fingers to just past wrist. No numbness/tingling. Pulses intact. Notable warmth and swelling on exam. Today improving exam  - c/w Ceftriaxone 1g QD  - c/w Vancomycin 1g Q12   - f/u Vancomycin trough ordered for 10:00 pm 5/20  - hand surgery consulted, appreciate recs  - f/u MRI hand read     Problem/Plan - 2:  ·  Problem: Transaminitis.   ·  Plan: Patient without abdominal pain, no N/.V. Exam unremarkable. RUQ US WNL  - continue to trend LFT.  -If persistent elevation will need MRCP      Problem/Plan - 3:  ·  Problem: Prophylactic measure.   ·  Plan: E: as needed  N: Regular  DVT: Lovenox 40 mg SC QD  Code: Full Code  D: [facility].    If patient opts to leave today, will be discharged AMA

## 2023-05-21 NOTE — PROGRESS NOTE ADULT - REASON FOR ADMISSION
L hand pain, erythema, and swelling

## 2023-05-21 NOTE — PROGRESS NOTE ADULT - SUBJECTIVE AND OBJECTIVE BOX
24HR EVENTS:     SUBJECTIVE: Pt seen and examined on morning rounds. L hand pain significantly improved, has been diligent about elevating LUE in liz block at all times   Denies CP, SOB, N/V, new onset motor/sensory deficits    Vital Signs Last 24 Hrs  T(C): 37.5 (21 May 2023 05:08), Max: 37.5 (21 May 2023 05:08)  T(F): 99.5 (21 May 2023 05:08), Max: 99.5 (21 May 2023 05:08)  HR: 96 (21 May 2023 05:08) (96 - 99)  BP: 137/70 (21 May 2023 05:08) (137/70 - 170/92)  BP(mean): 93 (21 May 2023 05:08) (93 - 110)  RR: 18 (21 May 2023 05:08) (16 - 19)  SpO2: 97% (21 May 2023 05:08) (96% - 99%)    Parameters below as of 21 May 2023 05:08  Patient On (Oxygen Delivery Method): room air      Physical Exam:  General: AAOx3, NAD  L Hand: significant improvement in swelling and erythema @ dorsum of L hand along w/ dorsum of L MCP and L 4th prox phalanx compared to 5/20 exam   ROM of L 4th finger MCP/PIP limited 2/2 swelling, reduced ROM of L 4th DIP as well, FROM all other joints of hand and wrist of LUE   SILT grossly M/R/U/LABC/MABC  Radial Pulse 2+  No pain with passive stretch of L 4th finger  Flexor sheath of L 4th finger non ttp       Labs:                        10.5   17.30 )-----------( 462      ( 21 May 2023 05:30 )             32.2     21 May 2023 05:30    138    |  100    |  6      ----------------------------<  115    3.6     |  27     |  0.54     Ca    9.2        21 May 2023 05:30  Phos  3.1       20 May 2023 05:30  Mg     1.8       20 May 2023 05:30    TPro  7.9    /  Alb  3.2    /  TBili  1.6    /  DBili  x      /  AST  63     /  ALT  128    /  AlkPhos  454    21 May 2023 05:30    PT/INR - ( 19 May 2023 13:23 )   PT: 15.0 sec;   INR: 1.29          PTT - ( 19 May 2023 13:23 )  PTT:30.5 sec      A/P: 52yFemale w cellulitis of L hand and L 4th finger. No concern for pyogenic flexor tenosynovitis of 4th finger at this time. significant improvement in L hand erythema and swelling this AM.     Plan   - c/w IV abx as per med, okay to DC pt later this evening after AM dose of IV abx and continued Liz block elevation AT ALL TIMES, please transition her to PO abx on DC, defer PO abx choice to primary team; patient should continue with elevation of LUE at all times until she f/u with her own orthopedist in [place]. She has a flight to [place] at 7 am on 5/22 AM.   - elevate LUE at all times in liz block   - rest of care per primary     - Discussed with Dr. Thompson

## 2023-05-22 VITALS
SYSTOLIC BLOOD PRESSURE: 146 MMHG | RESPIRATION RATE: 18 BRPM | OXYGEN SATURATION: 96 % | TEMPERATURE: 98 F | DIASTOLIC BLOOD PRESSURE: 88 MMHG | HEART RATE: 94 BPM

## 2023-05-22 LAB
ALBUMIN SERPL ELPH-MCNC: 3.2 G/DL — LOW (ref 3.3–5)
ALP SERPL-CCNC: 463 U/L — HIGH (ref 40–120)
ALT FLD-CCNC: 118 U/L — HIGH (ref 10–45)
ANION GAP SERPL CALC-SCNC: 12 MMOL/L — SIGNIFICANT CHANGE UP (ref 5–17)
AST SERPL-CCNC: 69 U/L — HIGH (ref 10–40)
BASOPHILS # BLD AUTO: 0.07 K/UL — SIGNIFICANT CHANGE UP (ref 0–0.2)
BASOPHILS NFR BLD AUTO: 0.4 % — SIGNIFICANT CHANGE UP (ref 0–2)
BILIRUB SERPL-MCNC: 1.2 MG/DL — SIGNIFICANT CHANGE UP (ref 0.2–1.2)
BUN SERPL-MCNC: 8 MG/DL — SIGNIFICANT CHANGE UP (ref 7–23)
CALCIUM SERPL-MCNC: 9.2 MG/DL — SIGNIFICANT CHANGE UP (ref 8.4–10.5)
CHLORIDE SERPL-SCNC: 96 MMOL/L — SIGNIFICANT CHANGE UP (ref 96–108)
CO2 SERPL-SCNC: 27 MMOL/L — SIGNIFICANT CHANGE UP (ref 22–31)
CREAT SERPL-MCNC: 0.65 MG/DL — SIGNIFICANT CHANGE UP (ref 0.5–1.3)
EGFR: 106 ML/MIN/1.73M2 — SIGNIFICANT CHANGE UP
EOSINOPHIL # BLD AUTO: 0.16 K/UL — SIGNIFICANT CHANGE UP (ref 0–0.5)
EOSINOPHIL NFR BLD AUTO: 1 % — SIGNIFICANT CHANGE UP (ref 0–6)
GLUCOSE SERPL-MCNC: 119 MG/DL — HIGH (ref 70–99)
HCT VFR BLD CALC: 31.9 % — LOW (ref 34.5–45)
HGB BLD-MCNC: 10.4 G/DL — LOW (ref 11.5–15.5)
IMM GRANULOCYTES NFR BLD AUTO: 3.1 % — HIGH (ref 0–0.9)
LYMPHOCYTES # BLD AUTO: 20.7 % — SIGNIFICANT CHANGE UP (ref 13–44)
LYMPHOCYTES # BLD AUTO: 3.32 K/UL — HIGH (ref 1–3.3)
MAGNESIUM SERPL-MCNC: 1.9 MG/DL — SIGNIFICANT CHANGE UP (ref 1.6–2.6)
MCHC RBC-ENTMCNC: 28.9 PG — SIGNIFICANT CHANGE UP (ref 27–34)
MCHC RBC-ENTMCNC: 32.6 GM/DL — SIGNIFICANT CHANGE UP (ref 32–36)
MCV RBC AUTO: 88.6 FL — SIGNIFICANT CHANGE UP (ref 80–100)
MONOCYTES # BLD AUTO: 1.04 K/UL — HIGH (ref 0–0.9)
MONOCYTES NFR BLD AUTO: 6.5 % — SIGNIFICANT CHANGE UP (ref 2–14)
NEUTROPHILS # BLD AUTO: 10.93 K/UL — HIGH (ref 1.8–7.4)
NEUTROPHILS NFR BLD AUTO: 68.3 % — SIGNIFICANT CHANGE UP (ref 43–77)
NRBC # BLD: 0 /100 WBCS — SIGNIFICANT CHANGE UP (ref 0–0)
PHOSPHATE SERPL-MCNC: 4.4 MG/DL — SIGNIFICANT CHANGE UP (ref 2.5–4.5)
PLATELET # BLD AUTO: 508 K/UL — HIGH (ref 150–400)
POTASSIUM SERPL-MCNC: 3.8 MMOL/L — SIGNIFICANT CHANGE UP (ref 3.5–5.3)
POTASSIUM SERPL-SCNC: 3.8 MMOL/L — SIGNIFICANT CHANGE UP (ref 3.5–5.3)
PROT SERPL-MCNC: 7.9 G/DL — SIGNIFICANT CHANGE UP (ref 6–8.3)
RBC # BLD: 3.6 M/UL — LOW (ref 3.8–5.2)
RBC # FLD: 13.8 % — SIGNIFICANT CHANGE UP (ref 10.3–14.5)
SODIUM SERPL-SCNC: 135 MMOL/L — SIGNIFICANT CHANGE UP (ref 135–145)
WBC # BLD: 16.01 K/UL — HIGH (ref 3.8–10.5)
WBC # FLD AUTO: 16.01 K/UL — HIGH (ref 3.8–10.5)

## 2023-05-22 PROCEDURE — 96375 TX/PRO/DX INJ NEW DRUG ADDON: CPT

## 2023-05-22 PROCEDURE — 99285 EMERGENCY DEPT VISIT HI MDM: CPT | Mod: 25

## 2023-05-22 PROCEDURE — 86140 C-REACTIVE PROTEIN: CPT

## 2023-05-22 PROCEDURE — 86706 HEP B SURFACE ANTIBODY: CPT

## 2023-05-22 PROCEDURE — 80048 BASIC METABOLIC PNL TOTAL CA: CPT

## 2023-05-22 PROCEDURE — 83605 ASSAY OF LACTIC ACID: CPT

## 2023-05-22 PROCEDURE — 83735 ASSAY OF MAGNESIUM: CPT

## 2023-05-22 PROCEDURE — 73130 X-RAY EXAM OF HAND: CPT

## 2023-05-22 PROCEDURE — 73219 MRI UPPER EXTREMITY W/DYE: CPT

## 2023-05-22 PROCEDURE — 85610 PROTHROMBIN TIME: CPT

## 2023-05-22 PROCEDURE — 87635 SARS-COV-2 COVID-19 AMP PRB: CPT

## 2023-05-22 PROCEDURE — 86703 HIV-1/HIV-2 1 RESULT ANTBDY: CPT

## 2023-05-22 PROCEDURE — 85025 COMPLETE CBC W/AUTO DIFF WBC: CPT

## 2023-05-22 PROCEDURE — 36415 COLL VENOUS BLD VENIPUNCTURE: CPT

## 2023-05-22 PROCEDURE — 87340 HEPATITIS B SURFACE AG IA: CPT

## 2023-05-22 PROCEDURE — 80053 COMPREHEN METABOLIC PANEL: CPT

## 2023-05-22 PROCEDURE — 80202 ASSAY OF VANCOMYCIN: CPT

## 2023-05-22 PROCEDURE — 86709 HEPATITIS A IGM ANTIBODY: CPT

## 2023-05-22 PROCEDURE — 86803 HEPATITIS C AB TEST: CPT

## 2023-05-22 PROCEDURE — 81001 URINALYSIS AUTO W/SCOPE: CPT

## 2023-05-22 PROCEDURE — 82977 ASSAY OF GGT: CPT

## 2023-05-22 PROCEDURE — A9585: CPT

## 2023-05-22 PROCEDURE — 86705 HEP B CORE ANTIBODY IGM: CPT

## 2023-05-22 PROCEDURE — 84100 ASSAY OF PHOSPHORUS: CPT

## 2023-05-22 PROCEDURE — 84702 CHORIONIC GONADOTROPIN TEST: CPT

## 2023-05-22 PROCEDURE — 96374 THER/PROPH/DIAG INJ IV PUSH: CPT

## 2023-05-22 PROCEDURE — 76705 ECHO EXAM OF ABDOMEN: CPT

## 2023-05-22 PROCEDURE — 86704 HEP B CORE ANTIBODY TOTAL: CPT

## 2023-05-22 PROCEDURE — 87040 BLOOD CULTURE FOR BACTERIA: CPT

## 2023-05-22 PROCEDURE — 99232 SBSQ HOSP IP/OBS MODERATE 35: CPT | Mod: GC

## 2023-05-22 PROCEDURE — 73201 CT UPPER EXTREMITY W/DYE: CPT

## 2023-05-22 PROCEDURE — 85730 THROMBOPLASTIN TIME PARTIAL: CPT

## 2023-05-22 PROCEDURE — 85652 RBC SED RATE AUTOMATED: CPT

## 2023-05-22 PROCEDURE — 87086 URINE CULTURE/COLONY COUNT: CPT

## 2023-05-22 RX ORDER — CEPHALEXIN 500 MG
1 CAPSULE ORAL
Qty: 44 | Refills: 0
Start: 2023-05-22

## 2023-05-22 RX ORDER — VANCOMYCIN HCL 1 G
1500 VIAL (EA) INTRAVENOUS EVERY 12 HOURS
Refills: 0 | Status: DISCONTINUED | OUTPATIENT
Start: 2023-05-22 | End: 2023-05-22

## 2023-05-22 RX ADMIN — Medication 300 MILLIGRAM(S): at 13:14

## 2023-05-22 RX ADMIN — CEFTRIAXONE 100 MILLIGRAM(S): 500 INJECTION, POWDER, FOR SOLUTION INTRAMUSCULAR; INTRAVENOUS at 07:43

## 2023-05-22 RX ADMIN — Medication 300 MILLIGRAM(S): at 00:15

## 2023-05-22 RX ADMIN — ENOXAPARIN SODIUM 40 MILLIGRAM(S): 100 INJECTION SUBCUTANEOUS at 07:43

## 2023-05-22 NOTE — DISCHARGE NOTE PROVIDER - NSDCCPTREATMENT_GEN_ALL_CORE_FT
PRINCIPAL PROCEDURE  Procedure: MRI  Findings and Treatment: ACC: [id number] EXAM:  MR HAND IC LT   ORDERED BY: JOSUE BABB   PROCEDURE DATE:  05/20/2023    IMPRESSION:  1.   Abnormal enhancing soft tissue surrounding the flexor tendons in the   carpal tunnel which extends along the 4th flexor tendon sheath and to   lesser extent 2nd and 3rd flexor tendon sheaths. This is consistent with  tenosynovitis which could be infectious or inflammatory.  2.   This was seen to involve the extensor carpi ulnaris tendon on the   prior  CT. This is not visible on the current scan as the area of involvementwas  proximal to the area included on this scan.  --- End of Report ---

## 2023-05-22 NOTE — DISCHARGE NOTE PROVIDER - NSDCMRMEDTOKEN_GEN_ALL_CORE_FT
cephalexin 500 mg oral capsule: 1 cap(s) orally 4 times a day  doxycycline hyclate 100 mg oral capsule: 1 cap(s) orally 2 times a day

## 2023-05-22 NOTE — DISCHARGE NOTE NURSING/CASE MANAGEMENT/SOCIAL WORK - PATIENT PORTAL LINK FT
You can access the FollowMyHealth Patient Portal offered by [facility] by registering at the following website: [email or web address]. By joining [facility]’s FollowMyHealth portal, you will also be able to view your health information using other applications (apps) compatible with our system.

## 2023-05-22 NOTE — DISCHARGE NOTE NURSING/CASE MANAGEMENT/SOCIAL WORK - NSDCFUADDAPPT_GEN_ALL_CORE_FT
(1) Please follow up with your primary care doctor as soon as you return to the [facility].

## 2023-05-22 NOTE — DISCHARGE NOTE PROVIDER - ATTENDING DISCHARGE PHYSICAL EXAMINATION:
Patient with resolution of edema, and erythema, ROM improved. Patient requesting discharge as has a flight to [place]. Bcx negative, eduacted on OT to maintain joints function. Patient denies abdominal pain, no N/V, aware of abnormal LFTs, will be following once in [place]. No other complaints or events reported.  General: AOX3, NAD, lying in bed, speaking in full sentences, no labored breathing on RA  HEENT: AT/NC, no facial asymmetry  Lungs: no crackles, no wheezes  Heart: RRR  Abdomen: soft, non-tender, no guarding, Negative [place]  Extremities: Left 4th digit edema, able to flex, no erythmea, resolved erythema and edema of rest of hand. LE: warm, no edema, no calf tenderness, no cofal deficit    Patient will be switched to PO ATB to cover for rest of course, Continue OT. Patient will follow-up with physician in [place].   LFTs abnormalities, stable, asymptomatic. Will get work-up as outpatient

## 2023-05-22 NOTE — DISCHARGE NOTE PROVIDER - NSDCCPCAREPLAN_GEN_ALL_CORE_FT
PRINCIPAL DISCHARGE DIAGNOSIS  Diagnosis: Tenosynovitis  Assessment and Plan of Treatment: Cellulitis ([id number]-u-[id number]-tis) is a common, potentially serious bacterial skin infection. The affected skin is swollen and inflamed and is typically painful and warm to the touch.  Cellulitis usually affects the lower legs, but it can occur on the face, arms and other areas. The infection happens when a break in the skin allows bacteria to enter.  Left untreated, the infection can spread to the lymph nodes and bloodstream and rapidly become life-threatening. It isn't usually spread from person to person.  You were found to have cellulitis of ****, and were treated with ****.       PRINCIPAL DISCHARGE DIAGNOSIS  Diagnosis: Tenosynovitis  Assessment and Plan of Treatment: Cellulitis (nkr-t-[id number]-tis) is a common, potentially serious bacterial skin infection. The affected skin is swollen and inflamed and is typically painful and warm to the touch.  Cellulitis usually affects the lower legs, but it can occur on the face, arms and other areas. The infection happens when a break in the skin allows bacteria to enter.  Left untreated, the infection can spread to the lymph nodes and bloodstream and rapidly become life-threatening. It isn't usually spread from person to person.  You were found to have cellulitis of your L hand and were treated with IV anbitiotics (ceftriaxone and vancomycin). Our hand surgeons were consulted, recommending no surgical intervention. An MRI was obtained that showed involvement of the tendon.  *******************************************  - Please take keflex 500mg four times a day through 5/28. You may start taking this antibiotic tomorrow morning.  - Please take doxycycline 100mg four times a day through 5/28. You may start taking this antibiotic tonight.  - Please mobilize your fingers in L hand multiple times a day (especially L 4th finger).  - Follow up with your PCP outpatient. Repeat CBC and CMP to ensure labs normalize.        PRINCIPAL DISCHARGE DIAGNOSIS  Diagnosis: Tenosynovitis  Assessment and Plan of Treatment: Cellulitis (iyp-h-IIN-tis) is a common, potentially serious bacterial skin infection. The affected skin is swollen and inflamed and is typically painful and warm to the touch.  Cellulitis usually affects the lower legs, but it can occur on the face, arms and other areas. The infection happens when a break in the skin allows bacteria to enter.  Left untreated, the infection can spread to the lymph nodes and bloodstream and rapidly become life-threatening. It isn't usually spread from person to person.  You were found to have cellulitis of your L hand and were treated with IV anbitiotics (ceftriaxone and vancomycin). Our hand surgeons were consulted, recommending no surgical intervention. An MRI was obtained that showed involvement of the tendon.  *******************************************  - Please take keflex 500mg four times a day through 5/28. You may start taking this antibiotic tomorrow morning.  - Please take doxycycline 100mg two times a day through 5/28. You may start taking this antibiotic tonight.  - Please mobilize your fingers in L hand multiple times a day (especially L 4th finger).  - Follow up with your PCP outpatient. Repeat CBC and CMP to ensure labs normalize.

## 2023-05-22 NOTE — DISCHARGE NOTE PROVIDER - HOSPITAL COURSE
51 yo F with no known past medical Hx, who presents with L hand swelling, erythema, and pain, admitted for L hand cellulitis.          Problem/Plan - 1:  ·  Problem: Sepsis.   ·  Plan: # Cellulitis of Left Hand  Pt meeting 2/4 SIRS on admission:  and WBC 25.46. Likely 2/2 L hand cellulitis. L hand swelling extending from all fingers to just past wrist. No numbness/tingling. Pulses intact. Notable warmth and swelling on exam. Today improving exam  - c/w Ceftriaxone 1g QD  - c/w Vancomycin 1g Q12   - f/u Vancomycin trough ordered for 10:00 pm 5/20  - hand surgery consulted, appreciate recs  - f/u MRI hand read     Problem/Plan - 2:  ·  Problem: Transaminitis.   ·  Plan: Patient without abdominal pain, no N/.V. Exam unremarkable. RUQ US WNL  - continue to trend LFT.  -If persistent elevation will need MRCP    53 yo F with no known past medical Hx, who presents with L hand swelling, erythema, and pain, admitted for L hand cellulitis. Patient was treated with IV antibiotics (CTX 1g and vanc) with improvement on exam. MRI with tenosynovitis, so plan for 14 day abx course. Hand surgery consulted, no surgical intervention. Patient also with mild transaminitis, alk phos elevation and thrombocytosis, likely all reactive from infection. Patient visiting from the [facility], will plan to follow up with PCP when returns to home country for repeat labs.    #Cellulitis of Left Hand  #Sepsis on admission - RESOLVED  Pt meeting 2/4 SIRS on admission:  and WBC 25.46. Likely 2/2 L hand cellulitis. L hand swelling extending from all fingers to just past wrist. No numbness/tingling. Pulses intact. Notable warmth and swelling on exam. MRI showing tenosynovitis. Hand surgery consulted: no surgical intervention, elevate LUE at all times in liz block. Exam improving though still with swelling in L ring finger and limited ROM. Plan to d/c on doxycycline and keflex.  - MRI hand 5/20/23: tenosynovitis 4th flexor tendon, lesser extent 2nd and 3rd flexor tendon  Plan:  - elevate LUE at all times in liz block  - doxycycline 100 BID x14d (5/19-5/28)  - keflex 500 QID x14d (5/19-5/28)  - OT for 4th digit    #Transaminitis - IMPROVING  #Elevated alk phos  Patient without abdominal pain, no N/V. Exam unremarkable. RUQ US WNL. Likely reactive i/s/o infection.  - f/u CMP outpt w/ PCP    #Thrombocytosis  Patient with new thrombocytosis, highest plt 508, likely reactive i/s/o infection.  - f/u CBC outpt w/ PCP    Patient was discharged to: home    New medications: keflex 500mg QID, doxy 100mg BID  Changes to old medications: none  Medications that were stopped: none  Items to follow up as outpatient: CBC and CMP w/ PCP    Physical exam at the time of discharge:  Constitutional: Resting comfortably in bed; NAD  Head: NC/AT  Eyes: EOMI, anicteric sclera  ENT: no nasal discharge; MMM  Neck: supple  Respiratory: clear to auscultation bilaterally; no wheezes, ronchi, increased work of breathing, retractions  Cardiac: RRR; normal S1/S2, no MRG, no LE edema  Gastrointestinal: +BSx4, abdomen soft, NT/ND; no rebound or guarding  Extremities: WWPx4; swelling significantly improved L hand, significant swelling 4th digit w/ limited ROM, all other digits good ROM, sensation intact.  Vascular: 2+ radial pulses bilaterally  Dermatologic: skin warm, dry and intact; swelling as above for extremities  Neurologic: AAOx3; CNII-XII grossly intact; no focal deficits

## 2023-05-25 DIAGNOSIS — A41.9 SEPSIS, UNSPECIFIED ORGANISM: ICD-10-CM

## 2023-05-25 DIAGNOSIS — E87.6 HYPOKALEMIA: ICD-10-CM

## 2023-05-25 DIAGNOSIS — M65.9 SYNOVITIS AND TENOSYNOVITIS, UNSPECIFIED: ICD-10-CM

## 2023-05-25 DIAGNOSIS — L03.114 CELLULITIS OF LEFT UPPER LIMB: ICD-10-CM

## 2023-05-25 DIAGNOSIS — Z20.822 CONTACT WITH AND (SUSPECTED) EXPOSURE TO COVID-19: ICD-10-CM

## 2023-05-25 DIAGNOSIS — Z91.013 ALLERGY TO SEAFOOD: ICD-10-CM

## 2023-05-25 DIAGNOSIS — E83.42 HYPOMAGNESEMIA: ICD-10-CM

## 2023-05-25 DIAGNOSIS — D75.838 OTHER THROMBOCYTOSIS: ICD-10-CM

## 2023-05-25 LAB
CULTURE RESULTS: SIGNIFICANT CHANGE UP
CULTURE RESULTS: SIGNIFICANT CHANGE UP
SPECIMEN SOURCE: SIGNIFICANT CHANGE UP
SPECIMEN SOURCE: SIGNIFICANT CHANGE UP
